# Patient Record
Sex: FEMALE | Race: ASIAN | Employment: STUDENT | URBAN - METROPOLITAN AREA
[De-identification: names, ages, dates, MRNs, and addresses within clinical notes are randomized per-mention and may not be internally consistent; named-entity substitution may affect disease eponyms.]

---

## 2022-02-21 ENCOUNTER — OFFICE VISIT (OUTPATIENT)
Dept: PSYCHIATRY | Facility: CLINIC | Age: 27
End: 2022-02-21
Payer: COMMERCIAL

## 2022-02-21 VITALS
HEIGHT: 63 IN | HEART RATE: 107 BPM | BODY MASS INDEX: 30.1 KG/M2 | WEIGHT: 169.88 LBS | SYSTOLIC BLOOD PRESSURE: 126 MMHG | DIASTOLIC BLOOD PRESSURE: 80 MMHG

## 2022-02-21 DIAGNOSIS — F41.1 GENERALIZED ANXIETY DISORDER: ICD-10-CM

## 2022-02-21 DIAGNOSIS — F33.1 MODERATE EPISODE OF RECURRENT MAJOR DEPRESSIVE DISORDER: Primary | ICD-10-CM

## 2022-02-21 DIAGNOSIS — F90.2 ADHD (ATTENTION DEFICIT HYPERACTIVITY DISORDER), COMBINED TYPE: ICD-10-CM

## 2022-02-21 PROCEDURE — 99999 PR PBB SHADOW E&M-NEW PATIENT-LVL II: CPT | Mod: PBBFAC,,, | Performed by: STUDENT IN AN ORGANIZED HEALTH CARE EDUCATION/TRAINING PROGRAM

## 2022-02-21 PROCEDURE — 99205 OFFICE O/P NEW HI 60 MIN: CPT | Mod: S$GLB,,, | Performed by: STUDENT IN AN ORGANIZED HEALTH CARE EDUCATION/TRAINING PROGRAM

## 2022-02-21 PROCEDURE — 99999 PR PBB SHADOW E&M-NEW PATIENT-LVL II: ICD-10-PCS | Mod: PBBFAC,,, | Performed by: STUDENT IN AN ORGANIZED HEALTH CARE EDUCATION/TRAINING PROGRAM

## 2022-02-21 PROCEDURE — 99205 PR OFFICE/OUTPT VISIT, NEW, LEVL V, 60-74 MIN: ICD-10-PCS | Mod: S$GLB,,, | Performed by: STUDENT IN AN ORGANIZED HEALTH CARE EDUCATION/TRAINING PROGRAM

## 2022-02-21 RX ORDER — FLUOXETINE HYDROCHLORIDE 20 MG/1
20 CAPSULE ORAL DAILY
Qty: 30 CAPSULE | Refills: 1 | Status: SHIPPED | OUTPATIENT
Start: 2022-02-21 | End: 2022-02-28

## 2022-02-21 NOTE — PROGRESS NOTES
OUTPATIENT PSYCHIATRY INITIAL VISIT    ENCOUNTER DATE:  2/21/2022  SITE:  [facility], [facility]  REFFERAL SOURCE:  Self, Aaareferral  LENGTH OF SESSION:  60 minutes        CHIEF COMPLAINT:   Depression, anxiety , adhd    HISTORY OF PRESENTING ILLNESS:  Flor Regalado is a 26 y.o. female with hx ADHD who presents for initial assessment.      History as told by Patient - & or family/friend/spouse/caregiver with pts permission  Pt diagnosed with ADHD in April 2021, was started on adderrall IR up to 20mg bid. Pt did not notice benefit but had side effects of appetite loss.  Pt also here to be evaluated for depression and anxiety. Started in high school 10th grade and also experienced in [employer] year [employer]. Pt with depressed mood, low energy, poor sleep, thoughts of guilt, gets increase in appetite, and has had passive SI, denies hx SA or active plan/intent.  Pt also with anxiety and worry, is tense throughout the day, has difficulty with sleep and gets easily frustrated.  Pt reports psychosocial stressor of family being unhappy and unsupportive of her. Reviewed ADHD symptoms as below.       DSM-V ADHD Criteria:    Inattentive symptoms 9/9:  · Often fails to give close attention to details or makes careless mistakes in schoolwork, at work, or during other activities (e.g., overlooks or misses details, work is inaccurate). yes   · Often has difficulty sustaining attention in tasks or play activities (e.g., has difficulty remaining focused during lectures, conversations, or lengthy reading). yes   · Often does not seem to listen when spoken to directly (e.g., mind seems elsewhere, even in the absence of any obvious distraction). yes -  · Often does not follow through on instructions and fails to finish schoolwork, chores, or duties in the workplace (e.g., starts tasks but quickly loses focus and is easily sidetracked). yes   · Often has difficulty organizing tasks and activities (e.g., difficulty managing  sequential tasks; difficulty keeping materials and belongings in order; messy, disorganized work; has poor time management; fails to meet deadlines). yes   · Often avoids, dislikes, or is reluctant to engage in tasks that require sustained mental effort (e.g., schoolwork or homework; for older adolescents and adults, preparing reports, completing forms, reviewing lengthy papers). yes   · Often loses things necessary for tasks or activities (e.g., school materials, pencils, books, tools, wallets, keys, papenwork, eyeglasses, mobile telephones). yes   · Is often easily distracted by extraneous stimuli (for older adolescents and adults, may include unrelated thoughts). yes   · Is often forgetful in daily activities (e.g., doing chores, running errands; for older adolescents and adults, returning calls, paying bills, keeping appointments). yes     Hyperactivity symptoms 6/9:  · Often fidgets with or taps hands or feet or squirms in seat. yes  · Often leaves seat in situations when remaining seated is expected (e.g., leaves his or her place in the classroom, in the office or other workplace, or in other situations that require remaining in place). No  · Often runs about or climbs in situations where it is inappropriate. (Note: In adolescents or adults, may be limited to feeling restless). yes  · Often unable to play or engage in leisure activities quietly. no  · Is often ?on the go,? acting as if ?driven by a motor? (e.g., is unable to be or uncomfortable being still for extended time, as in restaurants, meetings; may be experienced by others as being restless or difficult to keep up with). no  · Often talks excessively. yes -   · Often blurts out an answer before a question has been completed (e.g., completes people?s sentences; cannot wait for turn in conversation). yes -   · Often has difficulty waiting his or her turn (e.g., while waiting in line). yes - walk around  · Often interrupts or intrudes on others (e.g.,  butts into conversations, games, or activities; may start using other people?s things without asking or receiving permission; for adolescents and adults, may intrude into or take over what others are doing). yes - as a child      PSYCHIATRIC REVIEW OF SYMPTOMS: (Is patient experiencing or having changes in any of the following?)    Symptoms of Depression: diminished mood or loss of interest/anhedonia; irritability, diminished energy, change in sleep, change in appetite, diminished concentration or cognition or indecisiveness, PMA/R, excessive guilt or hopelessness or worthlessness, suicidal ideations - Passive/ Active, Self injurious behaviors- yes    Symptoms of STAR: excessive anxiety/worry/fear, more days than not, about numerous issues, difficult to control, with restlessness, fatigue, poor concentration, irritability, muscle tension, sleep disturbance; causes functionally impairing distress : yes    Symptoms of Panic Attacks: recurrent panic attacks-  SOB/ palpitations/ tremulousness, numbness/ paraesthesias/ nausea/ feeling of impending doom/ derealization/ depersonalization   Panic attacks are precipitated or un-precipitated, source of worry and/or behavioral changes secondary; with or without agoraphobia :  Yes, infrequent    Symptoms of lauren or hypomania: elevated, expansive, or irritable mood with increased energy or activity; with inflated self-esteem or grandiosity, decreased need for sleep, increased rate of speech,  racing thoughts, distractibility, increased goal directed activity or PMA, risky/disinhibited behavior:  No     Symptoms of psychosis: hallucinations, delusions, disorganized thinking, disorganized behavior or abnormal motor behavior, or negative symptoms (diminshed emotional expression, avolition, anhedonia, alogia, asociality :  no    Symptoms of PTSD: h/o trauma - physical abuse /sexual abuse / domestic violence/ other traumas); re-experiencing/intrusive symptoms, nightmares, avoidant  behavior, negative alterations in cognition or mood, or hyperarousal symptoms; with or without dissociative symptoms :  no    Sleep: initiation/ maintenance/ early morning awakening with inability to return to sleep/ hypnagogic or hypnaompic hallucinations:  Time to fall asleep    Other Psych ROS:    Symptoms of OCD: obsessions, compulsions or ruminations: no    Symptoms of Eating Disorders: anorexia, bulimia or binge eating:  Reports binge eating, but does not appear to satisfy disorder criteria    Symptoms of ADHD: inattention or hyperactivity:  As in hpi    Risk Parameters:  Patient reports no suicidal ideation  Patient reports no homicidal ideation  Patient reports no self-injurious behavior  Patient reports no violent behavior    PSYCHIATRIC MED REVIEW    Current psych meds  Medication side effects:  Loss of appetite  Medication compliance:   n/a    Previous psych meds trials  adderall IR up to 20mg bid    PAST PSYCHIATRIC HISTORY:  Previous Psychiatric Diagnoses:  adhd  Previous Psychiatric Hospitalizations:  no   Previous SI/HI:  passive  Previous Suicide Attempts:  no   Previous Self injurious behaviors: no  Previous Medication Trials:  adderall ir 20mg bid  Psychiatric Care (current & past):   Over past months for adhd only  History of Psychotherapy:  5-6 sessions only  History of Violence:  no    SUBSTANCE ABUSE HISTORY:  Caffeine: no  Tobacco:  no  Alcohol:  no  Illicit Substances:  no  Misuse of Prescription Medications:  No   Other: Herbal supplements/ online supplements: no        FAMILY HISTORY:  Psychiatric:  Mom with depression, adhd, and self harm.  Family H/o suicide:  no    SOCIAL HISTORY:  Developmental/Childhood:Achieved all developmental milestones timely  Education:Bachelor's Degree in math and music  Employment Status/Finances:   Relationship Status/Sexual Orientation: Single:    Children: 0  Housing Status: Home , lives alone   history:  NO  Access to Firearms: NO;  "  Sikhism:Non-practicing  Recreational activities:Crafts, video games, music    LEGAL HISTORY:   Past charges/incarcerations: No   Pending charges:No     NEUROLOGIC HISTORY:  Seizures:  no   Head trauma:  no    MEDICAL REVIEW OF SYSTEMS:  Complete review of systems performed covering Constitutional, Cardiovascular, Respiratory, Gastrointestinal, Musculoskeletal, Skin, Neurologic and Endocrine  All systems negative.    MEDICAL HISTORY:  No past medical history on file.    ALL MEDICATIONS:  No current outpatient medications on file.    ALLERGIES:  Review of patient's allergies indicates:  Not on File    RELEVANT LABS/STUDIES:    No results found for: WBC, HGB, HCT, MCV, PLT  BMP  No results found for: NA, K, CL, CO2, BUN, CREATININE, CALCIUM, ANIONGAP, ESTGFRAFRICA, EGFRNONAA  No results found for: ALT, AST, GGT, ALKPHOS, BILITOT  No results found for: TSH  No results found for: LABA1C, HGBA1C      VITALS  Vitals:    02/21/22 1345   BP: 126/80   Pulse: 107   Weight: 77 kg (169 lb 13.8 oz)   Height: 5' 3" (1.6 m)       PHYSICAL EXAM  General: well developed, well nourished  Neurologic:   Gait: Normal   Psychomotor signs:  No involuntary movements or tremor  AIMS: none    PSYCHIATRIC EXAM:    Mental Status Exam:  Appearance: unremarkable, age appropriate  Behavior/Cooperation: limited/ appropriate friendly and cooperative  Speech:  normal tone, normal rate, normal pitch, normal volume  Language: uses words appropriately; NO aphasia or dysarthria  Mood: anxious, depressed  Affect:  Congruent, tearful at times  Thought Process: normal and logical  Thought Content: normal, no suicidality, no homicidality, delusions, or paranoia  Level of Consciousness: Alert and Oriented x3  Memory:  Intact  Attention/concentration: appropriate for age/education.   Fund of Knowledge: appears adequate  Insight:  Intact  Judgment: Intact       IMPRESSION:    Flor Regalado is a 26 y.o. female with history of ADHD who presents for initial " assessment of depression, anxiety and trouble with focus/attention..    DIAGNOSES:    ICD-10-CM ICD-9-CM   1. Moderate episode of recurrent major depressive disorder  F33.1 296.32   2. Generalized anxiety disorder  F41.1 300.02   3. ADHD (attention deficit hyperactivity disorder), combined type  F90.2 314.01       PLAN:  Psych Med:   Start prozac 20mg. Pt reports intolerable nausea, sent in rx for prozac 10mg.  If not tolerated, consider lexapro 5mg initially.   Will address ADHD symptoms once significant anxiety/depression symptoms improve.    Consider referral to psychotherapy.      Discussed with patient informed consent, risks vs. benefits, alternative treatments, side effect profile and the inherent unpredictability of individual responses to these treatments. Answered any questions patient may have had. The patient expresses understanding of the above and displays the capacity to agree with this current plan     Other:     RETURN TO CLINIC: 1 month    M[place] MD  [facility], PGY-3  02/21/2022

## 2022-02-26 ENCOUNTER — PATIENT MESSAGE (OUTPATIENT)
Dept: PSYCHIATRY | Facility: CLINIC | Age: 27
End: 2022-02-26
Payer: COMMERCIAL

## 2022-02-28 ENCOUNTER — PATIENT MESSAGE (OUTPATIENT)
Dept: PSYCHIATRY | Facility: CLINIC | Age: 27
End: 2022-02-28
Payer: COMMERCIAL

## 2022-02-28 RX ORDER — FLUOXETINE 10 MG/1
10 CAPSULE ORAL DAILY
Qty: 30 CAPSULE | Refills: 0 | Status: SHIPPED | OUTPATIENT
Start: 2022-02-28 | End: 2022-03-21 | Stop reason: SDUPTHER

## 2022-03-19 ENCOUNTER — OFFICE VISIT (OUTPATIENT)
Dept: URGENT CARE | Facility: CLINIC | Age: 27
End: 2022-03-19
Payer: COMMERCIAL

## 2022-03-19 DIAGNOSIS — J45.909 ASTHMA, UNSPECIFIED ASTHMA SEVERITY, UNSPECIFIED WHETHER COMPLICATED, UNSPECIFIED WHETHER PERSISTENT: Primary | ICD-10-CM

## 2022-03-19 PROCEDURE — 1159F MED LIST DOCD IN RCRD: CPT | Mod: CPTII,S$GLB,, | Performed by: NURSE PRACTITIONER

## 2022-03-19 PROCEDURE — 99214 OFFICE O/P EST MOD 30 MIN: CPT | Mod: 25,S$GLB,, | Performed by: NURSE PRACTITIONER

## 2022-03-19 PROCEDURE — 3075F PR MOST RECENT SYSTOLIC BLOOD PRESS GE 130-139MM HG: ICD-10-PCS | Mod: CPTII,S$GLB,, | Performed by: NURSE PRACTITIONER

## 2022-03-19 PROCEDURE — 1159F PR MEDICATION LIST DOCUMENTED IN MEDICAL RECORD: ICD-10-PCS | Mod: CPTII,S$GLB,, | Performed by: NURSE PRACTITIONER

## 2022-03-19 PROCEDURE — 3008F PR BODY MASS INDEX (BMI) DOCUMENTED: ICD-10-PCS | Mod: CPTII,S$GLB,, | Performed by: NURSE PRACTITIONER

## 2022-03-19 PROCEDURE — 94640 AIRWAY INHALATION TREATMENT: CPT | Mod: S$GLB,,, | Performed by: NURSE PRACTITIONER

## 2022-03-19 PROCEDURE — 94640 PR INHAL RX, AIRWAY OBST/DX SPUTUM INDUCT: ICD-10-PCS | Mod: S$GLB,,, | Performed by: NURSE PRACTITIONER

## 2022-03-19 PROCEDURE — 3078F PR MOST RECENT DIASTOLIC BLOOD PRESSURE < 80 MM HG: ICD-10-PCS | Mod: CPTII,S$GLB,, | Performed by: NURSE PRACTITIONER

## 2022-03-19 PROCEDURE — 3075F SYST BP GE 130 - 139MM HG: CPT | Mod: CPTII,S$GLB,, | Performed by: NURSE PRACTITIONER

## 2022-03-19 PROCEDURE — 3078F DIAST BP <80 MM HG: CPT | Mod: CPTII,S$GLB,, | Performed by: NURSE PRACTITIONER

## 2022-03-19 PROCEDURE — 99214 PR OFFICE/OUTPT VISIT, EST, LEVL IV, 30-39 MIN: ICD-10-PCS | Mod: 25,S$GLB,, | Performed by: NURSE PRACTITIONER

## 2022-03-19 PROCEDURE — 3008F BODY MASS INDEX DOCD: CPT | Mod: CPTII,S$GLB,, | Performed by: NURSE PRACTITIONER

## 2022-03-19 RX ORDER — PREDNISONE 10 MG/1
10 TABLET ORAL DAILY
Qty: 5 TABLET | Refills: 0 | Status: SHIPPED | OUTPATIENT
Start: 2022-03-19 | End: 2022-03-24

## 2022-03-19 RX ORDER — NORGESTIMATE AND ETHINYL ESTRADIOL 0.25-0.035
KIT ORAL
COMMUNITY
Start: 2022-03-06

## 2022-03-19 RX ORDER — AMOXICILLIN AND CLAVULANATE POTASSIUM 875; 125 MG/1; MG/1
1 TABLET, FILM COATED ORAL 2 TIMES DAILY
Qty: 14 TABLET | Refills: 0 | Status: SHIPPED | OUTPATIENT
Start: 2022-03-19 | End: 2022-03-26

## 2022-03-19 RX ORDER — ALBUTEROL SULFATE 90 UG/1
2 AEROSOL, METERED RESPIRATORY (INHALATION) EVERY 6 HOURS PRN
Qty: 18 G | Refills: 3 | Status: SHIPPED | OUTPATIENT
Start: 2022-03-19 | End: 2022-04-18

## 2022-03-19 RX ORDER — ALBUTEROL SULFATE 0.83 MG/ML
2.5 SOLUTION RESPIRATORY (INHALATION)
Status: COMPLETED | OUTPATIENT
Start: 2022-03-19 | End: 2022-03-19

## 2022-03-19 RX ADMIN — ALBUTEROL SULFATE 2.5 MG: 0.83 SOLUTION RESPIRATORY (INHALATION) at 05:03

## 2022-03-19 NOTE — PROGRESS NOTES
"Subjective:       Patient ID: Flor Regalado is a 26 y.o. female.    Vitals:  height is 5' 3" (1.6 m) and weight is 76.7 kg (169 lb). Her temperature is 98.1 °F (36.7 °C). Her blood pressure is 134/79 and her pulse is 90. Her respiration is 18 and oxygen saturation is 96%.     Chief Complaint: Cough    Patient presents with c.o cough for the last 8 days. Cough is productive and is partned with wheezing. Patient notes the cough worse with activity or at night. Gives hx of asthma as a child. No fever. Patient with neg pcr covid test a few days ago.             Cough  This is a new problem. Episode onset: 8 days. The problem has been unchanged. The problem occurs every few minutes. The cough is productive of sputum. Associated symptoms include wheezing. Pertinent negatives include no chest pain, chills, ear pain, postnasal drip, sore throat or shortness of breath. The symptoms are aggravated by lying down. Treatments tried: mucinex dm. The treatment provided mild relief. Her past medical history is significant for asthma.   cough worse after act  Constitution: Negative for activity change, appetite change, chills and fatigue.   HENT: Negative for ear pain, ear discharge, hearing loss, facial swelling, congestion, postnasal drip, sinus pain, sinus pressure, sore throat, trouble swallowing and voice change.    Cardiovascular: Negative for chest trauma, chest pain, leg swelling and sob on exertion.   Respiratory: Positive for cough and wheezing. Negative for chest tightness and shortness of breath.    Neurological: Negative for dizziness, light-headedness, passing out, disorientation and altered mental status.   Psychiatric/Behavioral: Negative for altered mental status, disorientation, confusion, agitation and nervous/anxious. The patient is not nervous/anxious.        Objective:      Physical Exam   Constitutional: She is oriented to person, place, and time. She appears well-developed. She is cooperative.  Non-toxic " appearance. She does not appear ill. No distress.   HENT:   Head: Normocephalic and atraumatic.   Ears:   Right Ear: Hearing, tympanic membrane, external ear and ear canal normal.   Left Ear: Hearing, tympanic membrane, external ear and ear canal normal.   Nose: Nose normal. No mucosal edema, rhinorrhea or nasal deformity. No epistaxis. Right sinus exhibits no maxillary sinus tenderness and no frontal sinus tenderness. Left sinus exhibits no maxillary sinus tenderness and no frontal sinus tenderness.   Mouth/Throat: Uvula is midline, oropharynx is clear and moist and mucous membranes are normal. No trismus in the jaw. Normal dentition. No uvula swelling. No oropharyngeal exudate, posterior oropharyngeal edema or posterior oropharyngeal erythema.   Eyes: Conjunctivae and lids are normal. No scleral icterus.   Neck: Trachea normal and phonation normal. Neck supple. No edema present. No erythema present. No neck rigidity present.   Cardiovascular: Normal rate, regular rhythm, normal heart sounds and normal pulses.   Pulmonary/Chest: Effort normal. No respiratory distress. She has no decreased breath sounds. She has wheezes. She has no rhonchi.   Abdominal: Normal appearance.   Musculoskeletal: Normal range of motion.         General: No deformity. Normal range of motion.   Neurological: She is alert and oriented to person, place, and time. She exhibits normal muscle tone. Coordination normal.   Skin: Skin is warm, dry, intact, not diaphoretic and not pale.   Psychiatric: Her speech is normal and behavior is normal. Judgment and thought content normal.   Nursing note and vitals reviewed.  wheezing much improved after having albuerol sn tx      Assessment:           asthma  Plan:         Flor was seen today for cough.    Diagnoses and all orders for this visit:    Asthma, unspecified asthma severity, unspecified whether complicated, unspecified whether persistent    Other orders  -     albuterol nebulizer solution 2.5  mg  -     amoxicillin-clavulanate 875-125mg (AUGMENTIN) 875-125 mg per tablet; Take 1 tablet by mouth 2 (two) times daily. for 7 days  -     predniSONE (DELTASONE) 10 MG tablet; Take 1 tablet (10 mg total) by mouth once daily. for 5 days  -     albuterol (PROVENTIL/VENTOLIN HFA) 90 mcg/actuation inhaler; Inhale 2 puffs into the lungs every 6 (six) hours as needed for Wheezing.        Education  Pt has been given instructions populated from [facility] [employer] database and those entered into patient instructions field and has verbalized understanding of the after visit summary and information contained wherein.    Follow Up  If no better 3-5 day fu with pcp.    In Case of Emergency   May go to ER for acute shortness of breath, lightheadedness, fever, or any other emergent complaints or changes in condition.

## 2022-03-20 VITALS
WEIGHT: 169 LBS | HEIGHT: 63 IN | RESPIRATION RATE: 18 BRPM | SYSTOLIC BLOOD PRESSURE: 134 MMHG | TEMPERATURE: 98 F | DIASTOLIC BLOOD PRESSURE: 79 MMHG | BODY MASS INDEX: 29.95 KG/M2 | OXYGEN SATURATION: 96 % | HEART RATE: 90 BPM

## 2022-03-21 ENCOUNTER — OFFICE VISIT (OUTPATIENT)
Dept: PSYCHIATRY | Facility: CLINIC | Age: 27
End: 2022-03-21
Payer: COMMERCIAL

## 2022-03-21 VITALS
WEIGHT: 175.94 LBS | SYSTOLIC BLOOD PRESSURE: 133 MMHG | DIASTOLIC BLOOD PRESSURE: 67 MMHG | BODY MASS INDEX: 31.16 KG/M2 | HEART RATE: 83 BPM

## 2022-03-21 DIAGNOSIS — F41.1 GENERALIZED ANXIETY DISORDER: ICD-10-CM

## 2022-03-21 DIAGNOSIS — F90.2 ADHD (ATTENTION DEFICIT HYPERACTIVITY DISORDER), COMBINED TYPE: ICD-10-CM

## 2022-03-21 DIAGNOSIS — F33.1 MODERATE EPISODE OF RECURRENT MAJOR DEPRESSIVE DISORDER: Primary | ICD-10-CM

## 2022-03-21 PROCEDURE — 99999 PR PBB SHADOW E&M-EST. PATIENT-LVL II: CPT | Mod: PBBFAC,,, | Performed by: STUDENT IN AN ORGANIZED HEALTH CARE EDUCATION/TRAINING PROGRAM

## 2022-03-21 PROCEDURE — 99213 PR OFFICE/OUTPT VISIT, EST, LEVL III, 20-29 MIN: ICD-10-PCS | Mod: S$GLB,,, | Performed by: STUDENT IN AN ORGANIZED HEALTH CARE EDUCATION/TRAINING PROGRAM

## 2022-03-21 PROCEDURE — 99213 OFFICE O/P EST LOW 20 MIN: CPT | Mod: S$GLB,,, | Performed by: STUDENT IN AN ORGANIZED HEALTH CARE EDUCATION/TRAINING PROGRAM

## 2022-03-21 PROCEDURE — 99999 PR PBB SHADOW E&M-EST. PATIENT-LVL II: ICD-10-PCS | Mod: PBBFAC,,, | Performed by: STUDENT IN AN ORGANIZED HEALTH CARE EDUCATION/TRAINING PROGRAM

## 2022-03-21 RX ORDER — FLUOXETINE 10 MG/1
10 CAPSULE ORAL DAILY
Qty: 30 CAPSULE | Refills: 0 | Status: SHIPPED | OUTPATIENT
Start: 2022-03-21 | End: 2022-03-21 | Stop reason: SDUPTHER

## 2022-03-21 RX ORDER — FLUOXETINE 10 MG/1
10 CAPSULE ORAL DAILY
Qty: 30 CAPSULE | Refills: 0 | Status: SHIPPED | OUTPATIENT
Start: 2022-03-21 | End: 2022-05-30 | Stop reason: ALTCHOICE

## 2022-03-21 NOTE — PROGRESS NOTES
OUTPATIENT PSYCHIATRY FOLLOW UP VISIT    ENCOUNTER DATE:  3/21/2022  SITE:  [facility]      HISTORY OF PRESENTING ILLNESS:  Flor Regalado is a 26 y.o. female with hx ADHD, MDD, STAR who presents for follow up.       Pt reports improvement on prozac 10mg, could not tolerate 20mg due to nausea, but not having any side effects at current dose.  Mood is better, more relaxed, not as easily frustrated or overwhelmed. No panic attacks or 'crises'. Sleep has remained the same. Still having distractible symptoms that pt finds is driven by ADHD rather than anxiety/depression. Still snacking more frequently than pt desires. No SI.  Pt OK with remaining at same dose and addressing ADHD symptoms on next visit.         DSM-V ADHD Criteria:    Inattentive symptoms 9/9:  · Often fails to give close attention to details or makes careless mistakes in schoolwork, at work, or during other activities (e.g., overlooks or misses details, work is inaccurate). yes   · Often has difficulty sustaining attention in tasks or play activities (e.g., has difficulty remaining focused during lectures, conversations, or lengthy reading). yes   · Often does not seem to listen when spoken to directly (e.g., mind seems elsewhere, even in the absence of any obvious distraction). yes -  · Often does not follow through on instructions and fails to finish schoolwork, chores, or duties in the workplace (e.g., starts tasks but quickly loses focus and is easily sidetracked). yes   · Often has difficulty organizing tasks and activities (e.g., difficulty managing sequential tasks; difficulty keeping materials and belongings in order; messy, disorganized work; has poor time management; fails to meet deadlines). yes   · Often avoids, dislikes, or is reluctant to engage in tasks that require sustained mental effort (e.g., schoolwork or homework; for older adolescents and adults, preparing reports, completing forms, reviewing lengthy papers).  yes   · Often loses things necessary for tasks or activities (e.g., school materials, pencils, books, tools, wallets, keys, papenwork, eyeglasses, mobile telephones). yes   · Is often easily distracted by extraneous stimuli (for older adolescents and adults, may include unrelated thoughts). yes   · Is often forgetful in daily activities (e.g., doing chores, running errands; for older adolescents and adults, returning calls, paying bills, keeping appointments). yes     Hyperactivity symptoms 6/9:  · Often fidgets with or taps hands or feet or squirms in seat. yes  · Often leaves seat in situations when remaining seated is expected (e.g., leaves his or her place in the classroom, in the office or other workplace, or in other situations that require remaining in place). No  · Often runs about or climbs in situations where it is inappropriate. (Note: In adolescents or adults, may be limited to feeling restless). yes  · Often unable to play or engage in leisure activities quietly. no  · Is often ?on the go,? acting as if ?driven by a motor? (e.g., is unable to be or uncomfortable being still for extended time, as in restaurants, meetings; may be experienced by others as being restless or difficult to keep up with). no  · Often talks excessively. yes -   · Often blurts out an answer before a question has been completed (e.g., completes people?s sentences; cannot wait for turn in conversation). yes -   · Often has difficulty waiting his or her turn (e.g., while waiting in line). yes - walk around  · Often interrupts or intrudes on others (e.g., butts into conversations, games, or activities; may start using other people?s things without asking or receiving permission; for adolescents and adults, may intrude into or take over what others are doing). yes - as a child      PSYCHIATRIC REVIEW OF SYSTEMS:(none/ yes- better/worse/stable/& what symptoms)    Symptoms of Depression:  improved    Symptoms of Anxiety/ panic attacks:  improved, no panic attacks    Symptoms of Katey/Hypomania: none    Symptoms of psychosis: none    Sleep: time to fall asleep    Appetite: snacks more frequently when bored.     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         Risk Parameters:  Patient reports no suicidal ideation  Patient reports no homicidal ideation  Patient reports no self-injurious behavior  Patient reports no violent behavior    PSYCHIATRIC MED REVIEW    Current psych meds  Medication side effects:  Loss of appetite  Medication compliance:   n/a    Previous psych meds trials  adderall IR up to 20mg bid    PAST PSYCHIATRIC HISTORY:  Previous Psychiatric Diagnoses:  adhd  Previous Psychiatric Hospitalizations:  no   Previous SI/HI:  passive  Previous Suicide Attempts:  no   Previous Self injurious behaviors: no  Previous Medication Trials:  adderall ir 20mg bid  Psychiatric Care (current & past):   Over past months for adhd only  History of Psychotherapy:  5-6 sessions only  History of Violence:  no    SUBSTANCE ABUSE HISTORY:  Caffeine: no  Tobacco:  no  Alcohol:  no  Illicit Substances:  no  Misuse of Prescription Medications:  No   Other: Herbal supplements/ online supplements: no      FAMILY HISTORY:  Psychiatric:  Mom with depression, adhd, and self harm.  Family H/o suicide:  no    SOCIAL HISTORY:  Developmental/Childhood:Achieved all developmental milestones timely  Education:Bachelor's Degree in math and music  Employment Status/Finances:   Relationship Status/Sexual Orientation: Single:    Children: 0  Housing Status: Home , lives alone   history:  NO  Access to Firearms: NO;   Denominational:Non-practicing  Recreational activities:Crafts, video games, music    LEGAL HISTORY:   Past charges/incarcerations: No   Pending charges:No     NEUROLOGIC HISTORY:  Seizures:  no   Head trauma:  no    MEDICAL REVIEW OF  SYSTEMS:  Complete review of systems performed covering Constitutional, Cardiovascular, Respiratory, Gastrointestinal, Musculoskeletal, Skin, Neurologic and Endocrine  All systems negative.    MEDICAL HISTORY:  Past Medical History:   Diagnosis Date    Anxiety     Depression        ALL MEDICATIONS:    Current Outpatient Medications:     albuterol (PROVENTIL/VENTOLIN HFA) 90 mcg/actuation inhaler, Inhale 2 puffs into the lungs every 6 (six) hours as needed for Wheezing., Disp: 18 g, Rfl: 3    amoxicillin-clavulanate 875-125mg (AUGMENTIN) 875-125 mg per tablet, Take 1 tablet by mouth 2 (two) times daily. for 7 days, Disp: 14 tablet, Rfl: 0    ESTARYLLA 0.25-35 mg-mcg per tablet, Take by mouth., Disp: , Rfl:     FLUoxetine 10 MG capsule, Take 1 capsule (10 mg total) by mouth once daily., Disp: 30 capsule, Rfl: 0    predniSONE (DELTASONE) 10 MG tablet, Take 1 tablet (10 mg total) by mouth once daily. for 5 days, Disp: 5 tablet, Rfl: 0    ALLERGIES:  Review of patient's allergies indicates:   Allergen Reactions    Shellfish containing products Other (See Comments)     Throat itching         RELEVANT LABS/STUDIES:    No results found for: WBC, HGB, HCT, MCV, PLT  BMP  No results found for: NA, K, CL, CO2, BUN, CREATININE, CALCIUM, ANIONGAP, ESTGFRAFRICA, EGFRNONAA  No results found for: ALT, AST, GGT, ALKPHOS, BILITOT  No results found for: TSH  No results found for: LABA1C, HGBA1C      VITALS  Vitals:    03/21/22 0915   BP: 133/67   Pulse: 83   Weight: 79.8 kg (175 lb 14.8 oz)       PHYSICAL EXAM  General: well developed, well nourished  Neurologic:   Gait: Normal   Psychomotor signs:  No involuntary movements or tremor  AIMS: none    PSYCHIATRIC EXAM:    Mental Status Exam:  Appearance: unremarkable, age appropriate  Behavior/Cooperation: limited/ appropriate friendly and cooperative  Speech:  normal tone, normal rate, normal pitch, normal volume  Language: uses words appropriately; NO aphasia or  dysarthria  Mood: anxious, depressed  Affect:  Congruent, tearful at times  Thought Process: normal and logical  Thought Content: normal, no suicidality, no homicidality, delusions, or paranoia  Level of Consciousness: Alert and Oriented x3  Memory:  Intact  Attention/concentration: appropriate for age/education.   Fund of Knowledge: appears adequate  Insight:  Intact  Judgment: Intact       IMPRESSION:    Flor Regalado is a 26 y.o. female with history of ADHD who presents for initial assessment of depression, anxiety and trouble with focus/attention..    DIAGNOSES:    ICD-10-CM ICD-9-CM   1. Moderate episode of recurrent major depressive disorder  F33.1 296.32   2. Generalized anxiety disorder  F41.1 300.02   3. ADHD (attention deficit hyperactivity disorder), combined type  F90.2 314.01       PLAN:  Psych Med:   Continue prozac 10mg.    Will address ADHD symptoms once significant anxiety/depression symptoms improve.    Consider referral to psychotherapy if needed.      Discussed with patient informed consent, risks vs. benefits, alternative treatments, side effect profile and the inherent unpredictability of individual responses to these treatments. Answered any questions patient may have had. The patient expresses understanding of the above and displays the capacity to agree with this current plan       RETURN TO CLINIC: 1 month    Mt Warren MD  [facility], PGY-3  03/21/2022

## 2022-03-28 ENCOUNTER — OFFICE VISIT (OUTPATIENT)
Dept: PRIMARY CARE CLINIC | Facility: CLINIC | Age: 27
End: 2022-03-28
Payer: COMMERCIAL

## 2022-03-28 VITALS
HEIGHT: 63 IN | RESPIRATION RATE: 18 BRPM | HEART RATE: 84 BPM | TEMPERATURE: 98 F | WEIGHT: 177 LBS | OXYGEN SATURATION: 98 % | BODY MASS INDEX: 31.36 KG/M2 | DIASTOLIC BLOOD PRESSURE: 70 MMHG | SYSTOLIC BLOOD PRESSURE: 122 MMHG

## 2022-03-28 DIAGNOSIS — Z00.00 ANNUAL PHYSICAL EXAM: ICD-10-CM

## 2022-03-28 DIAGNOSIS — M25.519 SHOULDER PAIN, UNSPECIFIED CHRONICITY, UNSPECIFIED LATERALITY: ICD-10-CM

## 2022-03-28 DIAGNOSIS — F33.1 MODERATE EPISODE OF RECURRENT MAJOR DEPRESSIVE DISORDER: ICD-10-CM

## 2022-03-28 DIAGNOSIS — R05.9 COUGH: Primary | ICD-10-CM

## 2022-03-28 PROCEDURE — 99999 PR PBB SHADOW E&M-EST. PATIENT-LVL V: CPT | Mod: PBBFAC,,, | Performed by: FAMILY MEDICINE

## 2022-03-28 PROCEDURE — 1160F RVW MEDS BY RX/DR IN RCRD: CPT | Mod: CPTII,S$GLB,, | Performed by: FAMILY MEDICINE

## 2022-03-28 PROCEDURE — 3078F DIAST BP <80 MM HG: CPT | Mod: CPTII,S$GLB,, | Performed by: FAMILY MEDICINE

## 2022-03-28 PROCEDURE — 3078F PR MOST RECENT DIASTOLIC BLOOD PRESSURE < 80 MM HG: ICD-10-PCS | Mod: CPTII,S$GLB,, | Performed by: FAMILY MEDICINE

## 2022-03-28 PROCEDURE — 3008F PR BODY MASS INDEX (BMI) DOCUMENTED: ICD-10-PCS | Mod: CPTII,S$GLB,, | Performed by: FAMILY MEDICINE

## 2022-03-28 PROCEDURE — 1160F PR REVIEW ALL MEDS BY PRESCRIBER/CLIN PHARMACIST DOCUMENTED: ICD-10-PCS | Mod: CPTII,S$GLB,, | Performed by: FAMILY MEDICINE

## 2022-03-28 PROCEDURE — 3074F PR MOST RECENT SYSTOLIC BLOOD PRESSURE < 130 MM HG: ICD-10-PCS | Mod: CPTII,S$GLB,, | Performed by: FAMILY MEDICINE

## 2022-03-28 PROCEDURE — 3074F SYST BP LT 130 MM HG: CPT | Mod: CPTII,S$GLB,, | Performed by: FAMILY MEDICINE

## 2022-03-28 PROCEDURE — 1159F PR MEDICATION LIST DOCUMENTED IN MEDICAL RECORD: ICD-10-PCS | Mod: CPTII,S$GLB,, | Performed by: FAMILY MEDICINE

## 2022-03-28 PROCEDURE — 99999 PR PBB SHADOW E&M-EST. PATIENT-LVL V: ICD-10-PCS | Mod: PBBFAC,,, | Performed by: FAMILY MEDICINE

## 2022-03-28 PROCEDURE — 99203 OFFICE O/P NEW LOW 30 MIN: CPT | Mod: S$GLB,,, | Performed by: FAMILY MEDICINE

## 2022-03-28 PROCEDURE — 1159F MED LIST DOCD IN RCRD: CPT | Mod: CPTII,S$GLB,, | Performed by: FAMILY MEDICINE

## 2022-03-28 PROCEDURE — 3008F BODY MASS INDEX DOCD: CPT | Mod: CPTII,S$GLB,, | Performed by: FAMILY MEDICINE

## 2022-03-28 PROCEDURE — 99203 PR OFFICE/OUTPT VISIT, NEW, LEVL III, 30-44 MIN: ICD-10-PCS | Mod: S$GLB,,, | Performed by: FAMILY MEDICINE

## 2022-03-28 RX ORDER — IBUPROFEN 600 MG/1
600 TABLET ORAL 3 TIMES DAILY
Qty: 21 TABLET | Refills: 0 | Status: SHIPPED | OUTPATIENT
Start: 2022-03-28

## 2022-03-28 RX ORDER — INFLUENZA VIRUS VACCINE 15; 15; 15; 15 UG/.5ML; UG/.5ML; UG/.5ML; UG/.5ML
SUSPENSION INTRAMUSCULAR
COMMUNITY
Start: 2021-10-01

## 2022-03-28 RX ORDER — MONTELUKAST SODIUM 10 MG/1
10 TABLET ORAL NIGHTLY
Qty: 90 TABLET | Refills: 0 | Status: SHIPPED | OUTPATIENT
Start: 2022-03-28 | End: 2022-04-27

## 2022-03-28 RX ORDER — FLUTICASONE PROPIONATE 110 UG/1
1 AEROSOL, METERED RESPIRATORY (INHALATION) 2 TIMES DAILY
Qty: 12 G | Refills: 0 | Status: SHIPPED | OUTPATIENT
Start: 2022-03-28 | End: 2023-03-28

## 2022-03-28 NOTE — PATIENT INSTRUCTIONS
Start singulair daily and use flovent inhaler twice a day and wash mouth out after using.  Use albuterol as needed.  Get Chest Xray  Get fasting labs  Use ibuprofen 600mg three times a day for 1 week with food.

## 2022-03-28 NOTE — PROGRESS NOTES
[facility] Note    Chief Complaint      Chief Complaint   Patient presents with    Establish Care       History of Present Illness      Flor Regalado is a 26 y.o. female who presents today for:    Asthma -  Used nebulizer and allergy shots as child.  No inhalers.  Never hospitalized  Seen in urgent care for 2 weeks of productive cough. Was treated with augmentin and steroid and given albuterol for wheeze.  Minimally better.   Felt like nebulizer made her better and albuterol helps.     No fever, no sore throat. No abdominal pain and not worse after eating.       2.5 weeks ago woke up with left shoulder pain.  Intermittent. 2-3/ day.  Hurts when raises her arm.     Boyfriend started with symptoms yest.  Works at a school.  PCR tests Qweek and negative.       Patient Care Team:  Trinity Whitman MD as PCP - General (Internal Medicine)     Health Maintenance:  Immunization History   Administered Date(s) Administered    COVID-19, MRNA, LN-S, PF (Pfizer) (Purple Cap) 03/02/2021, 03/23/2021, 10/01/2021    Influenza - Quadrivalent - PF *Preferred* (6 months and older) 10/01/2021      Health Maintenance   Topic Date Due    Hepatitis C Screening  Never done    Lipid Panel  Never done    HPV Vaccines (1 - 2-dose series) Never done    Chlamydia Screening  Never done    TETANUS VACCINE  Never done    Pap Smear  Never done        Past Medical History:  Past Medical History:   Diagnosis Date    Anxiety     Depression        Past Surgical History:   has no past surgical history on file.    Family History:  family history includes Breast cancer in her maternal grandmother; Diabetes in her maternal grandfather; Thyroid cancer in her mother.     Social History:  Social History     Tobacco Use    Smoking status: Never Smoker    Smokeless tobacco: Never Used   Substance Use Topics    Alcohol use: Yes     Alcohol/week: 4.0 standard drinks     Types: 4 Standard drinks or equivalent per week    Drug use: Never  "      Review of Systems   Constitutional: Negative for fever.   Respiratory: Negative for shortness of breath.    Cardiovascular: Negative for chest pain.   Gastrointestinal: Negative for change in bowel habit and change in bowel habit.   Genitourinary: Negative for difficulty urinating.        Medications:    Current Outpatient Medications:     albuterol (PROVENTIL/VENTOLIN HFA) 90 mcg/actuation inhaler, Inhale 2 puffs into the lungs every 6 (six) hours as needed for Wheezing., Disp: 18 g, Rfl: 3    ESTARYLLA 0.25-35 mg-mcg per tablet, Take by mouth., Disp: , Rfl:     FLUoxetine 10 MG capsule, Take 1 capsule (10 mg total) by mouth once daily., Disp: 30 capsule, Rfl: 0    FLULAVAL QUAD 1395-1704, PF, 60 mcg (15 mcg x 4)/0.5 mL Syrg, , Disp: , Rfl:     fluticasone propionate (FLOVENT HFA) 110 mcg/actuation inhaler, Inhale 1 puff into the lungs 2 (two) times daily. Controller, Disp: 12 g, Rfl: 0    ibuprofen (ADVIL,MOTRIN) 600 MG tablet, Take 1 tablet (600 mg total) by mouth 3 (three) times daily., Disp: 21 tablet, Rfl: 0    montelukast (SINGULAIR) 10 mg tablet, Take 1 tablet (10 mg total) by mouth every evening., Disp: 90 tablet, Rfl: 0    sars-cov-2, covid-19, (PFIZER COVID-19) 30 mcg/0.3 ml injection, , Disp: , Rfl:      Allergies:  Review of patient's allergies indicates:   Allergen Reactions    Shellfish containing products Other (See Comments)     Throat itching         Physical Exam      Vital Signs  Temp: 98 °F (36.7 °C)  Pulse: 84  Resp: 18  SpO2: 98 %  BP: 122/70  BP Location: Left arm  Patient Position: Sitting  Pain Score:   2  Height and Weight  Height: 5' 3" (160 cm)  Weight: 80.3 kg (177 lb 0.5 oz)  BSA (Calculated - sq m): 1.89 sq meters  BMI (Calculated): 31.4  Weight in (lb) to have BMI = 25: 140.8      Patient Position: Sitting      Physical Exam  Vitals reviewed.   Constitutional:       General: She is not in acute distress.     Appearance: Normal appearance.   HENT:      Right Ear: " Tympanic membrane, ear canal and external ear normal.      Left Ear: Tympanic membrane, ear canal and external ear normal.      Mouth/Throat:      Mouth: Mucous membranes are moist.      Pharynx: Oropharynx is clear. No oropharyngeal exudate or posterior oropharyngeal erythema.   Eyes:      Extraocular Movements: Extraocular movements intact.      Conjunctiva/sclera: Conjunctivae normal.      Pupils: Pupils are equal, round, and reactive to light.   Cardiovascular:      Rate and Rhythm: Normal rate and regular rhythm.      Pulses: Normal pulses.      Heart sounds: No murmur heard.    No friction rub. No gallop.   Pulmonary:      Effort: Pulmonary effort is normal.      Breath sounds: No wheezing, rhonchi or rales.   Abdominal:      General: Abdomen is flat. Bowel sounds are normal. There is no distension.      Palpations: Abdomen is soft. There is no mass.      Tenderness: There is no abdominal tenderness.   Musculoskeletal:      Cervical back: Neck supple.      Right lower leg: No edema.      Left lower leg: No edema.   Lymphadenopathy:      Cervical: No cervical adenopathy.   Skin:     General: Skin is warm and dry.   Neurological:      General: No focal deficit present.      Mental Status: She is alert.   Psychiatric:         Mood and Affect: Mood normal.         Behavior: Behavior normal.          Laboratory:  CBC:        CMP:            URINALYSIS:         LIPIDS:        TSH:        A1C:        Urine Microalbumin/Cr:          Other:             Assessment/Plan     Flor Regalado is a 26 y.o.female with:    Cough  -     X-Ray Chest PA And Lateral; Future; Expected date: 03/28/2022  -     Lipase; Future; Expected date: 03/28/2022  Check Xray, flonase, singulair and prn albuterol    Moderate episode of recurrent major depressive disorder  - stable, continue current medication    Shoulder pain  Ibuprofen    Annual physical exam  -     CBC Auto Differential; Future; Expected date: 03/28/2022  -     Comprehensive  Metabolic Panel; Future; Expected date: 03/28/2022  -     Hepatitis C Antibody; Future; Expected date: 03/28/2022  -     HIV 1/2 Ag/Ab (4th Gen); Future; Expected date: 03/28/2022    Other orders  -     montelukast (SINGULAIR) 10 mg tablet; Take 1 tablet (10 mg total) by mouth every evening.  Dispense: 90 tablet; Refill: 0  -     fluticasone propionate (FLOVENT HFA) 110 mcg/actuation inhaler; Inhale 1 puff into the lungs 2 (two) times daily. Controller  Dispense: 12 g; Refill: 0  -     ibuprofen (ADVIL,MOTRIN) 600 MG tablet; Take 1 tablet (600 mg total) by mouth 3 (three) times daily.  Dispense: 21 tablet; Refill: 0         Chronic conditions status updated as per HPI.  Other than changes above, cont current medications and maintain follow up with specialists.  Return to clinic in Follow up in about 2 weeks (around 4/11/2022).      Trinity Whitman MD  [facility]

## 2022-03-28 NOTE — PROGRESS NOTES
STAFF COMMENTS: I have discussed pt with Dr. Warren and reviewed the history and exam. I agree and concur with the assessment and plan.

## 2022-04-01 ENCOUNTER — HOSPITAL ENCOUNTER (OUTPATIENT)
Dept: RADIOLOGY | Facility: OTHER | Age: 27
Discharge: HOME | End: 2022-04-01
Attending: FAMILY MEDICINE
Payer: COMMERCIAL

## 2022-04-01 DIAGNOSIS — R05.9 COUGH: ICD-10-CM

## 2022-04-01 PROCEDURE — 71046 X-RAY EXAM CHEST 2 VIEWS: CPT | Mod: 26,,, | Performed by: INTERNAL MEDICINE

## 2022-04-01 PROCEDURE — 71046 X-RAY EXAM CHEST 2 VIEWS: CPT | Mod: TC,FY

## 2022-04-01 PROCEDURE — 71046 XR CHEST PA AND LATERAL: ICD-10-PCS | Mod: 26,,, | Performed by: INTERNAL MEDICINE

## 2022-05-09 ENCOUNTER — OFFICE VISIT (OUTPATIENT)
Dept: PSYCHIATRY | Facility: CLINIC | Age: 27
End: 2022-05-09
Payer: COMMERCIAL

## 2022-05-09 VITALS
DIASTOLIC BLOOD PRESSURE: 74 MMHG | SYSTOLIC BLOOD PRESSURE: 118 MMHG | WEIGHT: 175.5 LBS | HEART RATE: 95 BPM | BODY MASS INDEX: 31.09 KG/M2

## 2022-05-09 DIAGNOSIS — F41.1 GENERALIZED ANXIETY DISORDER: ICD-10-CM

## 2022-05-09 DIAGNOSIS — F90.2 ADHD (ATTENTION DEFICIT HYPERACTIVITY DISORDER), COMBINED TYPE: ICD-10-CM

## 2022-05-09 DIAGNOSIS — F33.1 MODERATE EPISODE OF RECURRENT MAJOR DEPRESSIVE DISORDER: Primary | ICD-10-CM

## 2022-05-09 PROCEDURE — 99213 OFFICE O/P EST LOW 20 MIN: CPT | Mod: S$GLB,,, | Performed by: STUDENT IN AN ORGANIZED HEALTH CARE EDUCATION/TRAINING PROGRAM

## 2022-05-09 PROCEDURE — 99999 PR PBB SHADOW E&M-EST. PATIENT-LVL II: CPT | Mod: PBBFAC,,, | Performed by: STUDENT IN AN ORGANIZED HEALTH CARE EDUCATION/TRAINING PROGRAM

## 2022-05-09 PROCEDURE — 99213 PR OFFICE/OUTPT VISIT, EST, LEVL III, 20-29 MIN: ICD-10-PCS | Mod: S$GLB,,, | Performed by: STUDENT IN AN ORGANIZED HEALTH CARE EDUCATION/TRAINING PROGRAM

## 2022-05-09 PROCEDURE — 99999 PR PBB SHADOW E&M-EST. PATIENT-LVL II: ICD-10-PCS | Mod: PBBFAC,,, | Performed by: STUDENT IN AN ORGANIZED HEALTH CARE EDUCATION/TRAINING PROGRAM

## 2022-05-09 RX ORDER — ESCITALOPRAM OXALATE 5 MG/1
5 TABLET ORAL DAILY
Qty: 30 TABLET | Refills: 0 | Status: SHIPPED | OUTPATIENT
Start: 2022-05-09 | End: 2022-05-30 | Stop reason: SDUPTHER

## 2022-05-09 RX ORDER — METHYLPHENIDATE HYDROCHLORIDE 10 MG/1
10 CAPSULE, EXTENDED RELEASE ORAL EVERY MORNING
Qty: 30 CAPSULE | Refills: 0 | Status: SHIPPED | OUTPATIENT
Start: 2022-05-09 | End: 2022-05-30

## 2022-05-09 NOTE — PROGRESS NOTES
OUTPATIENT PSYCHIATRY FOLLOW UP VISIT    ENCOUNTER DATE:  5/9/2022  SITE:  [facility], [facility]      HISTORY OF PRESENTING ILLNESS:  Flor Regalado is a 26 y.o. female with hx ADHD, MDD, STAR who presents for follow up.       Pt reports running out of prozac 1 week ago due to missing last appointment, has noticed being more tired , also has been getting somewhat overwhelmed and anxious, feeling low or helpless when taking prozac 10mg. Also finds that a lot of anxiety is driven by adhd symptoms of not able to focus, losing things, trying to be 'normal' in front of others.  Pt states may be moving back to [place] next month. Is ok with trying different anti-depressant but would also like adhd treatment.       DSM-V ADHD Criteria:    Inattentive symptoms 9/9:  · Often fails to give close attention to details or makes careless mistakes in schoolwork, at work, or during other activities (e.g., overlooks or misses details, work is inaccurate). yes   · Often has difficulty sustaining attention in tasks or play activities (e.g., has difficulty remaining focused during lectures, conversations, or lengthy reading). yes   · Often does not seem to listen when spoken to directly (e.g., mind seems elsewhere, even in the absence of any obvious distraction). yes -  · Often does not follow through on instructions and fails to finish schoolwork, chores, or duties in the workplace (e.g., starts tasks but quickly loses focus and is easily sidetracked). yes   · Often has difficulty organizing tasks and activities (e.g., difficulty managing sequential tasks; difficulty keeping materials and belongings in order; messy, disorganized work; has poor time management; fails to meet deadlines). yes   · Often avoids, dislikes, or is reluctant to engage in tasks that require sustained mental effort (e.g., schoolwork or homework; for older adolescents and adults, preparing reports, completing forms, reviewing lengthy papers). yes    · Often loses things necessary for tasks or activities (e.g., school materials, pencils, books, tools, wallets, keys, papenwork, eyeglasses, mobile telephones). yes   · Is often easily distracted by extraneous stimuli (for older adolescents and adults, may include unrelated thoughts). yes   · Is often forgetful in daily activities (e.g., doing chores, running errands; for older adolescents and adults, returning calls, paying bills, keeping appointments). yes     Hyperactivity symptoms 6/9:  · Often fidgets with or taps hands or feet or squirms in seat. yes  · Often leaves seat in situations when remaining seated is expected (e.g., leaves his or her place in the classroom, in the office or other workplace, or in other situations that require remaining in place). No  · Often runs about or climbs in situations where it is inappropriate. (Note: In adolescents or adults, may be limited to feeling restless). yes  · Often unable to play or engage in leisure activities quietly. no  · Is often ?on the go,? acting as if ?driven by a motor? (e.g., is unable to be or uncomfortable being still for extended time, as in restaurants, meetings; may be experienced by others as being restless or difficult to keep up with). no  · Often talks excessively. yes -   · Often blurts out an answer before a question has been completed (e.g., completes people?s sentences; cannot wait for turn in conversation). yes -   · Often has difficulty waiting his or her turn (e.g., while waiting in line). yes - walk around  · Often interrupts or intrudes on others (e.g., butts into conversations, games, or activities; may start using other people?s things without asking or receiving permission; for adolescents and adults, may intrude into or take over what others are doing). yes - as a child      PSYCHIATRIC REVIEW OF SYSTEMS:(none/ yes- better/worse/stable/& what symptoms)    Symptoms of Depression:  improved    Symptoms of Anxiety/ panic attacks:  improved, no panic attacks    Symptoms of Katey/Hypomania: none    Symptoms of psychosis: none    Sleep: time to fall asleep    Appetite: snacks more frequently when bored.     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         Risk Parameters:  Patient reports no suicidal ideation  Patient reports no homicidal ideation  Patient reports no self-injurious behavior  Patient reports no violent behavior    PSYCHIATRIC MED REVIEW    Current psych meds  Medication side effects:  Loss of appetite  Medication compliance:   n/a    Previous psych meds trials  adderall IR up to 20mg bid    PAST PSYCHIATRIC HISTORY:  Previous Psychiatric Diagnoses:  adhd  Previous Psychiatric Hospitalizations:  no   Previous SI/HI:  passive  Previous Suicide Attempts:  no   Previous Self injurious behaviors: no  Previous Medication Trials:  adderall ir 20mg bid  Psychiatric Care (current & past):   Over past months for adhd only  History of Psychotherapy:  5-6 sessions only  History of Violence:  no    SUBSTANCE ABUSE HISTORY:  Caffeine: no  Tobacco:  no  Alcohol:  no  Illicit Substances:  no  Misuse of Prescription Medications:  No   Other: Herbal supplements/ online supplements: no      FAMILY HISTORY:  Psychiatric:  Mom with depression, adhd, and self harm.  Family H/o suicide:  no    SOCIAL HISTORY:  Developmental/Childhood:Achieved all developmental milestones timely  Education:Bachelor's Degree in math and music  Employment Status/Finances:   Relationship Status/Sexual Orientation: Single:    Children: 0  Housing Status: Home , lives alone   history:  NO  Access to Firearms: NO;   Sabianism:Non-practicing  Recreational activities:Crafts, video games, music    LEGAL HISTORY:   Past charges/incarcerations: No   Pending charges:No     NEUROLOGIC HISTORY:  Seizures:  no   Head trauma:  no    MEDICAL REVIEW OF  SYSTEMS:  Complete review of systems performed covering Constitutional, Cardiovascular, Respiratory, Gastrointestinal, Musculoskeletal, Skin, Neurologic and Endocrine  All systems negative.    MEDICAL HISTORY:  Past Medical History:   Diagnosis Date    Anxiety     Depression        ALL MEDICATIONS:    Current Outpatient Medications:     albuterol (PROVENTIL/VENTOLIN HFA) 90 mcg/actuation inhaler, Inhale 2 puffs into the lungs every 6 (six) hours as needed for Wheezing., Disp: 18 g, Rfl: 3    EScitalopram oxalate (LEXAPRO) 5 MG Tab, Take 1 tablet (5 mg total) by mouth once daily., Disp: 30 tablet, Rfl: 0    ESTARYLLA 0.25-35 mg-mcg per tablet, Take by mouth., Disp: , Rfl:     FLULAVAL QUAD 0800-7754, PF, 60 mcg (15 mcg x 4)/0.5 mL Syrg, , Disp: , Rfl:     FLUoxetine 10 MG capsule, Take 1 capsule (10 mg total) by mouth once daily., Disp: 30 capsule, Rfl: 0    fluticasone propionate (FLOVENT HFA) 110 mcg/actuation inhaler, Inhale 1 puff into the lungs 2 (two) times daily. Controller, Disp: 12 g, Rfl: 0    ibuprofen (ADVIL,MOTRIN) 600 MG tablet, Take 1 tablet (600 mg total) by mouth 3 (three) times daily., Disp: 21 tablet, Rfl: 0    methylphenidate HCl (RITALIN LA) 10 MG 24 hr capsule, Take 1 capsule (10 mg total) by mouth every morning., Disp: 30 capsule, Rfl: 0    sars-cov-2, covid-19, (PFIZER COVID-19) 30 mcg/0.3 ml injection, , Disp: , Rfl:     ALLERGIES:  Review of patient's allergies indicates:   Allergen Reactions    Shellfish containing products Other (See Comments)     Throat itching         RELEVANT LABS/STUDIES:    No results found for: WBC, HGB, HCT, MCV, PLT  BMP  No results found for: NA, K, CL, CO2, BUN, CREATININE, CALCIUM, ANIONGAP, ESTGFRAFRICA, EGFRNONAA  No results found for: ALT, AST, GGT, ALKPHOS, BILITOT  No results found for: TSH  No results found for: LABA1C, HGBA1C      VITALS  Vitals:    05/09/22 1545   BP: 118/74   Pulse: 95   Weight: 79.6 kg (175 lb 7.8 oz)       PHYSICAL  EXAM  General: well developed, well nourished  Neurologic:   Gait: Normal   Psychomotor signs:  No involuntary movements or tremor  AIMS: none    PSYCHIATRIC EXAM:    Mental Status Exam:  Appearance: unremarkable, age appropriate  Behavior/Cooperation: limited/ appropriate friendly and cooperative  Speech:  normal tone, normal rate, normal pitch, normal volume  Language: uses words appropriately; NO aphasia or dysarthria  Mood: anxious, depressed  Affect:    Thought Process: normal and logical  Thought Content: normal, no suicidality, no homicidality, delusions, or paranoia  Level of Consciousness: Alert and Oriented x3  Memory:  Intact  Attention/concentration: appropriate for age/education.   Fund of Knowledge: appears adequate  Insight:  Intact  Judgment: Intact       IMPRESSION:    Flor Regalado is a 26 y.o. female with history of ADHD who presents for initial assessment of depression, anxiety and trouble with focus/attention..    DIAGNOSES:    ICD-10-CM ICD-9-CM   1. Moderate episode of recurrent major depressive disorder  F33.1 296.32   2. Generalized anxiety disorder  F41.1 300.02   3. ADHD (attention deficit hyperactivity disorder), combined type  F90.2 314.01       PLAN:  Psych Med:   Switch prozac 10mg to lexapro 5mg due to unable tolerate higher doses. titrate    Trial ritalin LA 10mg daily.     Consider referral to psychotherapy if needed.      Discussed with patient informed consent, risks vs. benefits, alternative treatments, side effect profile and the inherent unpredictability of individual responses to these treatments. Answered any questions patient may have had. The patient expresses understanding of the above and displays the capacity to agree with this current plan       RETURN TO CLINIC: 3-4 weeks    Mt Warren MD  [facility], PGY-3  05/09/2022

## 2022-05-30 ENCOUNTER — OFFICE VISIT (OUTPATIENT)
Dept: PSYCHIATRY | Facility: CLINIC | Age: 27
End: 2022-05-30
Payer: COMMERCIAL

## 2022-05-30 DIAGNOSIS — F90.2 ADHD (ATTENTION DEFICIT HYPERACTIVITY DISORDER), COMBINED TYPE: Primary | ICD-10-CM

## 2022-05-30 DIAGNOSIS — F41.1 GENERALIZED ANXIETY DISORDER: ICD-10-CM

## 2022-05-30 DIAGNOSIS — F33.1 MODERATE EPISODE OF RECURRENT MAJOR DEPRESSIVE DISORDER: ICD-10-CM

## 2022-05-30 PROCEDURE — 99213 PR OFFICE/OUTPT VISIT, EST, LEVL III, 20-29 MIN: ICD-10-PCS | Mod: 95,,, | Performed by: STUDENT IN AN ORGANIZED HEALTH CARE EDUCATION/TRAINING PROGRAM

## 2022-05-30 PROCEDURE — 99213 OFFICE O/P EST LOW 20 MIN: CPT | Mod: 95,,, | Performed by: STUDENT IN AN ORGANIZED HEALTH CARE EDUCATION/TRAINING PROGRAM

## 2022-05-30 RX ORDER — ESCITALOPRAM OXALATE 10 MG/1
10 TABLET ORAL DAILY
Qty: 90 TABLET | Refills: 0 | Status: SHIPPED | OUTPATIENT
Start: 2022-05-30 | End: 2022-08-28

## 2022-05-30 RX ORDER — METHYLPHENIDATE HYDROCHLORIDE 20 MG/1
20 CAPSULE, EXTENDED RELEASE ORAL EVERY MORNING
Qty: 30 CAPSULE | Refills: 0 | Status: SHIPPED | OUTPATIENT
Start: 2022-05-30

## 2022-05-30 NOTE — PROGRESS NOTES
OUTPATIENT PSYCHIATRY FOLLOW UP VISIT    ENCOUNTER DATE:  5/30/2022  SITE:  [facility], [facility]      HISTORY OF PRESENTING ILLNESS:  Flor Regalado is a 26 y.o. female with hx ADHD, MDD, STAR who presents for follow up.       Pt taking lexapro and ritalin LA, denies adverse effects. Finds mood is better, less easily frustrated. Denies excessive anxiety or depression. Has not had much work or assignments, has not noticed much benefit on current dose of ritalin. Will be starting [employer] school in a few weeks.  Pt will also be moving to [place] in a few weeks, advised pt to find provider asap. Denies other complaints or concerns.       DSM-V ADHD Criteria:    Inattentive symptoms 9/9:  · Often fails to give close attention to details or makes careless mistakes in schoolwork, at work, or during other activities (e.g., overlooks or misses details, work is inaccurate). yes   · Often has difficulty sustaining attention in tasks or play activities (e.g., has difficulty remaining focused during lectures, conversations, or lengthy reading). yes   · Often does not seem to listen when spoken to directly (e.g., mind seems elsewhere, even in the absence of any obvious distraction). yes -  · Often does not follow through on instructions and fails to finish schoolwork, chores, or duties in the workplace (e.g., starts tasks but quickly loses focus and is easily sidetracked). yes   · Often has difficulty organizing tasks and activities (e.g., difficulty managing sequential tasks; difficulty keeping materials and belongings in order; messy, disorganized work; has poor time management; fails to meet deadlines). yes   · Often avoids, dislikes, or is reluctant to engage in tasks that require sustained mental effort (e.g., schoolwork or homework; for older adolescents and adults, preparing reports, completing forms, reviewing lengthy papers). yes   · Often loses things necessary for tasks or activities (e.g., school  materials, pencils, books, tools, wallets, keys, papenwork, eyeglasses, mobile telephones). yes   · Is often easily distracted by extraneous stimuli (for older adolescents and adults, may include unrelated thoughts). yes   · Is often forgetful in daily activities (e.g., doing chores, running errands; for older adolescents and adults, returning calls, paying bills, keeping appointments). yes     Hyperactivity symptoms 6/9:  · Often fidgets with or taps hands or feet or squirms in seat. yes  · Often leaves seat in situations when remaining seated is expected (e.g., leaves his or her place in the classroom, in the office or other workplace, or in other situations that require remaining in place). No  · Often runs about or climbs in situations where it is inappropriate. (Note: In adolescents or adults, may be limited to feeling restless). yes  · Often unable to play or engage in leisure activities quietly. no  · Is often ?on the go,? acting as if ?driven by a motor? (e.g., is unable to be or uncomfortable being still for extended time, as in restaurants, meetings; may be experienced by others as being restless or difficult to keep up with). no  · Often talks excessively. yes -   · Often blurts out an answer before a question has been completed (e.g., completes people?s sentences; cannot wait for turn in conversation). yes -   · Often has difficulty waiting his or her turn (e.g., while waiting in line). yes - walk around  · Often interrupts or intrudes on others (e.g., butts into conversations, games, or activities; may start using other people?s things without asking or receiving permission; for adolescents and adults, may intrude into or take over what others are doing). yes - as a child      PSYCHIATRIC REVIEW OF SYSTEMS:(none/ yes- better/worse/stable/& what symptoms)    Symptoms of Depression:  improved    Symptoms of Anxiety/ panic attacks: improved, no panic attacks    Symptoms of Katey/Hypomania: none    Symptoms  of psychosis: none    Sleep: better    Appetite: snacks more frequently when bored.     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         Risk Parameters:  Patient reports no suicidal ideation  Patient reports no homicidal ideation  Patient reports no self-injurious behavior  Patient reports no violent behavior    PSYCHIATRIC MED REVIEW    Current psych meds  Medication side effects:  Loss of appetite  Medication compliance:   n/a    Previous psych meds trials  adderall IR up to 20mg bid, prozac     PAST PSYCHIATRIC HISTORY:  Previous Psychiatric Diagnoses:  adhd  Previous Psychiatric Hospitalizations:  no   Previous SI/HI:  passive  Previous Suicide Attempts:  no   Previous Self injurious behaviors: no  Previous Medication Trials:  adderall ir 20mg bid  Psychiatric Care (current & past):   Over past months for adhd only  History of Psychotherapy:  5-6 sessions only  History of Violence:  no    SUBSTANCE ABUSE HISTORY:  Caffeine: no  Tobacco:  no  Alcohol:  no  Illicit Substances:  no  Misuse of Prescription Medications:  No   Other: Herbal supplements/ online supplements: no      FAMILY HISTORY:  Psychiatric:  Mom with depression, adhd, and self harm.  Family H/o suicide:  no    SOCIAL HISTORY:  Developmental/Childhood:Achieved all developmental milestones timely  Education:Bachelor's Degree in math and music  Employment Status/Finances:   Relationship Status/Sexual Orientation: Single:    Children: 0  Housing Status: Home , lives alone   history:  NO  Access to Firearms: NO;   Mormonism:Non-practicing  Recreational activities:Crafts, video games, music    LEGAL HISTORY:   Past charges/incarcerations: No   Pending charges:No     NEUROLOGIC HISTORY:  Seizures:  no   Head trauma:  no    MEDICAL REVIEW OF SYSTEMS:  Complete review of systems performed covering Constitutional, Cardiovascular,  Respiratory, Gastrointestinal, Musculoskeletal, Skin, Neurologic and Endocrine  All systems negative.    MEDICAL HISTORY:  Past Medical History:   Diagnosis Date    Anxiety     Depression        ALL MEDICATIONS:    Current Outpatient Medications:     albuterol (PROVENTIL/VENTOLIN HFA) 90 mcg/actuation inhaler, Inhale 2 puffs into the lungs every 6 (six) hours as needed for Wheezing., Disp: 18 g, Rfl: 3    EScitalopram oxalate (LEXAPRO) 10 MG tablet, Take 1 tablet (10 mg total) by mouth once daily., Disp: 90 tablet, Rfl: 0    ESTARYLLA 0.25-35 mg-mcg per tablet, Take by mouth., Disp: , Rfl:     FLULAVAL QUAD 5883-7850, PF, 60 mcg (15 mcg x 4)/0.5 mL Syrg, , Disp: , Rfl:     fluticasone propionate (FLOVENT HFA) 110 mcg/actuation inhaler, Inhale 1 puff into the lungs 2 (two) times daily. Controller, Disp: 12 g, Rfl: 0    ibuprofen (ADVIL,MOTRIN) 600 MG tablet, Take 1 tablet (600 mg total) by mouth 3 (three) times daily., Disp: 21 tablet, Rfl: 0    methylphenidate HCl (RITALIN LA) 20 MG 24 hr capsule, Take 1 capsule (20 mg total) by mouth every morning., Disp: 30 capsule, Rfl: 0    sars-cov-2, covid-19, (PFIZER COVID-19) 30 mcg/0.3 ml injection, , Disp: , Rfl:     ALLERGIES:  Review of patient's allergies indicates:   Allergen Reactions    Shellfish containing products Other (See Comments)     Throat itching         RELEVANT LABS/STUDIES:    No results found for: WBC, HGB, HCT, MCV, PLT  BMP  No results found for: NA, K, CL, CO2, BUN, CREATININE, CALCIUM, ANIONGAP, ESTGFRAFRICA, EGFRNONAA  No results found for: ALT, AST, GGT, ALKPHOS, BILITOT  No results found for: TSH  No results found for: LABA1C, HGBA1C      VITALS  There were no vitals filed for this visit.    PHYSICAL EXAM  General: well developed, well nourished  Neurologic:   Gait: Normal   Psychomotor signs:  No involuntary movements or tremor  AIMS: none    PSYCHIATRIC EXAM:    Mental Status Exam:  Appearance: unremarkable, age  "appropriate  Behavior/Cooperation: limited/ appropriate friendly and cooperative  Speech:  normal tone, normal rate, normal pitch, normal volume  Language: uses words appropriately; NO aphasia or dysarthria  Mood: steady, "better:  Affect:  Reactive, appropriate  Thought Process: normal and logical  Thought Content: normal, no suicidality, no homicidality, delusions, or paranoia  Level of Consciousness: Alert and Oriented x3  Memory:  Intact  Attention/concentration: appropriate for age/education.   Fund of Knowledge: appears adequate  Insight:  Intact  Judgment: Intact       IMPRESSION:    Flor Regalado is a 26 y.o. female with history of ADHD who presents for follow up of depression, anxiety and trouble with focus/attention..    DIAGNOSES:    ICD-10-CM ICD-9-CM   1. ADHD (attention deficit hyperactivity disorder), combined type  F90.2 314.01   2. Moderate episode of recurrent major depressive disorder  F33.1 296.32   3. Generalized anxiety disorder  F41.1 300.02       PLAN:  Psych Med:   Increase lexapro to 10mg , 90 d supply, advised to take half if develops side effects to higher dose.    Increase ritalin LA to 20mg daily. 30 d supply   Find provider in [place] asap.         Discussed with patient informed consent, risks vs. benefits, alternative treatments, side effect profile and the inherent unpredictability of individual responses to these treatments. Answered any questions patient may have had. The patient expresses understanding of the above and displays the capacity to agree with this current plan       RETURN TO CLINIC: 1 month if still in [place].     Mt Warren MD  [facility], PGY-3  05/30/2022        "

## 2023-01-25 ENCOUNTER — PATIENT MESSAGE (OUTPATIENT)
Dept: ADMINISTRATIVE | Facility: HOSPITAL | Age: 28
End: 2023-01-25
Payer: COMMERCIAL

## 2023-04-20 ENCOUNTER — PATIENT MESSAGE (OUTPATIENT)
Dept: PSYCHIATRY | Facility: CLINIC | Age: 28
End: 2023-04-20
Payer: COMMERCIAL

## 2023-08-18 ENCOUNTER — OFFICE VISIT (OUTPATIENT)
Dept: FAMILY MEDICINE CLINIC | Facility: CLINIC | Age: 28
End: 2023-08-18
Payer: COMMERCIAL

## 2023-08-18 VITALS
DIASTOLIC BLOOD PRESSURE: 62 MMHG | HEART RATE: 86 BPM | BODY MASS INDEX: 29.75 KG/M2 | SYSTOLIC BLOOD PRESSURE: 108 MMHG | OXYGEN SATURATION: 99 % | TEMPERATURE: 97 F | RESPIRATION RATE: 16 BRPM | HEIGHT: 63.78 IN | WEIGHT: 172.13 LBS

## 2023-08-18 DIAGNOSIS — F41.1 GAD (GENERALIZED ANXIETY DISORDER): ICD-10-CM

## 2023-08-18 DIAGNOSIS — F32.A DEPRESSION, UNSPECIFIED DEPRESSION TYPE: ICD-10-CM

## 2023-08-18 DIAGNOSIS — R68.81 EARLY SATIETY: ICD-10-CM

## 2023-08-18 DIAGNOSIS — E66.3 OVERWEIGHT (BMI 25.0-29.9): ICD-10-CM

## 2023-08-18 DIAGNOSIS — Z00.00 LABORATORY EXAM ORDERED AS PART OF ROUTINE GENERAL MEDICAL EXAMINATION: ICD-10-CM

## 2023-08-18 DIAGNOSIS — R10.13 EPIGASTRIC PAIN: ICD-10-CM

## 2023-08-18 DIAGNOSIS — Z00.01 ENCOUNTER FOR GENERAL ADULT MEDICAL EXAMINATION WITH ABNORMAL FINDINGS: Primary | ICD-10-CM

## 2023-08-18 DIAGNOSIS — F90.9 ATTENTION DEFICIT HYPERACTIVITY DISORDER (ADHD), UNSPECIFIED ADHD TYPE: ICD-10-CM

## 2023-08-18 PROBLEM — J45.20 MILD INTERMITTENT ASTHMA WITHOUT COMPLICATION: Status: ACTIVE | Noted: 2023-08-18

## 2023-08-18 PROBLEM — J45.20 MILD INTERMITTENT ASTHMA WITHOUT COMPLICATION (HCC): Status: ACTIVE | Noted: 2023-08-18

## 2023-08-18 PROCEDURE — 3078F DIAST BP <80 MM HG: CPT | Performed by: FAMILY MEDICINE

## 2023-08-18 PROCEDURE — 99203 OFFICE O/P NEW LOW 30 MIN: CPT | Performed by: FAMILY MEDICINE

## 2023-08-18 PROCEDURE — 3008F BODY MASS INDEX DOCD: CPT | Performed by: FAMILY MEDICINE

## 2023-08-18 PROCEDURE — 3074F SYST BP LT 130 MM HG: CPT | Performed by: FAMILY MEDICINE

## 2023-08-18 PROCEDURE — 99385 PREV VISIT NEW AGE 18-39: CPT | Performed by: FAMILY MEDICINE

## 2023-08-18 RX ORDER — BUPROPION HYDROCHLORIDE 150 MG/1
TABLET ORAL
COMMUNITY
Start: 2023-05-18 | End: 2023-08-18 | Stop reason: ALTCHOICE

## 2023-08-18 RX ORDER — BUPROPION HYDROCHLORIDE 300 MG/1
TABLET ORAL
COMMUNITY
Start: 2023-04-19

## 2023-08-18 RX ORDER — ALBUTEROL SULFATE 90 UG/1
2 AEROSOL, METERED RESPIRATORY (INHALATION) EVERY 6 HOURS PRN
COMMUNITY
Start: 2022-03-19

## 2023-08-18 RX ORDER — PANTOPRAZOLE SODIUM 20 MG/1
20 TABLET, DELAYED RELEASE ORAL
Qty: 60 TABLET | Refills: 0 | Status: SHIPPED | OUTPATIENT
Start: 2023-08-18

## 2023-08-18 RX ORDER — FLUTICASONE PROPIONATE 110 UG/1
1 AEROSOL, METERED RESPIRATORY (INHALATION) 2 TIMES DAILY
COMMUNITY
Start: 2022-03-28

## 2023-08-24 ENCOUNTER — LAB ENCOUNTER (OUTPATIENT)
Dept: LAB | Facility: HOSPITAL | Age: 28
End: 2023-08-24
Attending: FAMILY MEDICINE
Payer: COMMERCIAL

## 2023-08-24 DIAGNOSIS — Z00.01 ENCOUNTER FOR GENERAL ADULT MEDICAL EXAMINATION WITH ABNORMAL FINDINGS: ICD-10-CM

## 2023-08-24 LAB
ALBUMIN SERPL-MCNC: 4.1 G/DL (ref 3.4–5)
ALBUMIN/GLOB SERPL: 1.2 {RATIO} (ref 1–2)
ALP LIVER SERPL-CCNC: 85 U/L
ALT SERPL-CCNC: 23 U/L
ANION GAP SERPL CALC-SCNC: 5 MMOL/L (ref 0–18)
AST SERPL-CCNC: 17 U/L (ref 15–37)
BASOPHILS # BLD AUTO: 0.01 X10(3) UL (ref 0–0.2)
BASOPHILS NFR BLD AUTO: 0.2 %
BILIRUB SERPL-MCNC: 0.6 MG/DL (ref 0.1–2)
BUN BLD-MCNC: 11 MG/DL (ref 7–18)
CALCIUM BLD-MCNC: 9.1 MG/DL (ref 8.5–10.1)
CHLORIDE SERPL-SCNC: 107 MMOL/L (ref 98–112)
CHOLEST SERPL-MCNC: 196 MG/DL (ref ?–200)
CO2 SERPL-SCNC: 27 MMOL/L (ref 21–32)
CREAT BLD-MCNC: 0.75 MG/DL
EGFRCR SERPLBLD CKD-EPI 2021: 113 ML/MIN/1.73M2 (ref 60–?)
EOSINOPHIL # BLD AUTO: 0.03 X10(3) UL (ref 0–0.7)
EOSINOPHIL NFR BLD AUTO: 0.7 %
ERYTHROCYTE [DISTWIDTH] IN BLOOD BY AUTOMATED COUNT: 12.5 %
FASTING PATIENT LIPID ANSWER: YES
FASTING STATUS PATIENT QL REPORTED: YES
GLOBULIN PLAS-MCNC: 3.3 G/DL (ref 2.8–4.4)
GLUCOSE BLD-MCNC: 80 MG/DL (ref 70–99)
HCT VFR BLD AUTO: 41 %
HDLC SERPL-MCNC: 71 MG/DL (ref 40–59)
HGB BLD-MCNC: 14 G/DL
IMM GRANULOCYTES # BLD AUTO: 0.01 X10(3) UL (ref 0–1)
IMM GRANULOCYTES NFR BLD: 0.2 %
LDLC SERPL CALC-MCNC: 113 MG/DL (ref ?–100)
LYMPHOCYTES # BLD AUTO: 1.82 X10(3) UL (ref 1–4)
LYMPHOCYTES NFR BLD AUTO: 43.1 %
MCH RBC QN AUTO: 30.9 PG (ref 26–34)
MCHC RBC AUTO-ENTMCNC: 34.1 G/DL (ref 31–37)
MCV RBC AUTO: 90.5 FL
MONOCYTES # BLD AUTO: 0.3 X10(3) UL (ref 0.1–1)
MONOCYTES NFR BLD AUTO: 7.1 %
NEUTROPHILS # BLD AUTO: 2.05 X10 (3) UL (ref 1.5–7.7)
NEUTROPHILS # BLD AUTO: 2.05 X10(3) UL (ref 1.5–7.7)
NEUTROPHILS NFR BLD AUTO: 48.7 %
NONHDLC SERPL-MCNC: 125 MG/DL (ref ?–130)
OSMOLALITY SERPL CALC.SUM OF ELEC: 286 MOSM/KG (ref 275–295)
PLATELET # BLD AUTO: 278 10(3)UL (ref 150–450)
POTASSIUM SERPL-SCNC: 4.2 MMOL/L (ref 3.5–5.1)
PROT SERPL-MCNC: 7.4 G/DL (ref 6.4–8.2)
RBC # BLD AUTO: 4.53 X10(6)UL
SODIUM SERPL-SCNC: 139 MMOL/L (ref 136–145)
TRIGL SERPL-MCNC: 64 MG/DL (ref 30–149)
TSI SER-ACNC: 0.52 MIU/ML (ref 0.36–3.74)
VLDLC SERPL CALC-MCNC: 11 MG/DL (ref 0–30)
WBC # BLD AUTO: 4.2 X10(3) UL (ref 4–11)

## 2023-08-24 PROCEDURE — 36415 COLL VENOUS BLD VENIPUNCTURE: CPT

## 2023-08-24 PROCEDURE — 84443 ASSAY THYROID STIM HORMONE: CPT

## 2023-08-24 PROCEDURE — 80061 LIPID PANEL: CPT

## 2023-08-24 PROCEDURE — 85025 COMPLETE CBC W/AUTO DIFF WBC: CPT

## 2023-08-24 PROCEDURE — 80053 COMPREHEN METABOLIC PANEL: CPT

## 2023-08-28 ENCOUNTER — LAB ENCOUNTER (OUTPATIENT)
Dept: LAB | Facility: HOSPITAL | Age: 28
End: 2023-08-28
Attending: FAMILY MEDICINE
Payer: COMMERCIAL

## 2023-08-28 DIAGNOSIS — R68.81 EARLY SATIETY: ICD-10-CM

## 2023-08-28 PROCEDURE — 87338 HPYLORI STOOL AG IA: CPT

## 2023-08-31 LAB — H PYLORI AG STL QL IA: NEGATIVE

## 2024-08-05 ENCOUNTER — OFFICE VISIT (OUTPATIENT)
Dept: FAMILY MEDICINE CLINIC | Facility: CLINIC | Age: 29
End: 2024-08-05
Payer: COMMERCIAL

## 2024-08-05 DIAGNOSIS — Z11.1 SCREENING FOR TUBERCULOSIS: Primary | ICD-10-CM

## 2024-08-08 ENCOUNTER — OFFICE VISIT (OUTPATIENT)
Dept: FAMILY MEDICINE CLINIC | Facility: CLINIC | Age: 29
End: 2024-08-08

## 2024-08-08 DIAGNOSIS — Z11.1 ENCOUNTER FOR READING OF TUBERCULIN SKIN TEST: Primary | ICD-10-CM

## 2024-08-08 LAB — INDURATION (): 0 MM (ref 0–11)

## 2024-08-08 NOTE — PROGRESS NOTES
Patient presents for PPD read.     Test placed on 8/5/24    Results: 0.0 mm induration.     Letter of results printed and given patient.    No further questions or concerns at this time.

## 2025-06-17 ENCOUNTER — OFFICE VISIT (OUTPATIENT)
Dept: FAMILY MEDICINE CLINIC | Facility: CLINIC | Age: 30
End: 2025-06-17
Payer: COMMERCIAL

## 2025-06-17 VITALS
HEART RATE: 96 BPM | OXYGEN SATURATION: 98 % | HEIGHT: 63.78 IN | RESPIRATION RATE: 16 BRPM | DIASTOLIC BLOOD PRESSURE: 60 MMHG | BODY MASS INDEX: 27.48 KG/M2 | TEMPERATURE: 97 F | WEIGHT: 159 LBS | SYSTOLIC BLOOD PRESSURE: 100 MMHG

## 2025-06-17 DIAGNOSIS — Z00.01 ENCOUNTER FOR GENERAL ADULT MEDICAL EXAMINATION WITH ABNORMAL FINDINGS: Primary | ICD-10-CM

## 2025-06-17 DIAGNOSIS — F50.89 BINGE-PURGE BEHAVIOR: ICD-10-CM

## 2025-06-17 DIAGNOSIS — F41.1 GAD (GENERALIZED ANXIETY DISORDER): ICD-10-CM

## 2025-06-17 DIAGNOSIS — F90.2 ATTENTION DEFICIT HYPERACTIVITY DISORDER (ADHD), COMBINED TYPE: ICD-10-CM

## 2025-06-17 PROBLEM — F33.1 MODERATE EPISODE OF RECURRENT MAJOR DEPRESSIVE DISORDER (HCC): Status: ACTIVE | Noted: 2022-03-28

## 2025-06-17 RX ORDER — SERTRALINE HYDROCHLORIDE 25 MG/1
TABLET, FILM COATED ORAL
COMMUNITY
Start: 2025-05-23

## 2025-06-17 NOTE — ASSESSMENT & PLAN NOTE
Uncertain ADHD diagnosis; undergoing neuropsychological testing in [place], though not completed.  Had been prev under [place] by Psych for ADHD, though [place] provider requested formal testing; Difficulty accessing Vyvanse due to shortages and insurance. Adderall ineffective;   - Pt declined YONATAN Flaco, plans to find Psychiatry assistance herself

## 2025-06-17 NOTE — PROGRESS NOTES
Family Medicine Progress Note    Assessment & Plan:     Follow-Up: As needed  Transferring schools, needs support for psychiatric and general health management.  - Order nutritional labs at her convenience.  - Provide psychiatric referral and support services contact.  - Encourage contact for medication refills or local healthcare provider assistance.        Assessment & Plan  Encounter for general adult medical examination with abnormal findings  Wellness Exam done today and routine Preventative Care discussed as noted below.   -Pap smear: -  Record requested  -G&C testing: declined  -Contraception:  none   -Immunizations:  declined PCV  -Routine labs ordered.   -Healthy eating habits and regular exercise encouraged   Orders:    Lipid Panel; Future; Expected date: 06/17/2025    TSH W Reflex To Free T4; Future; Expected date: 06/17/2025    Binge-purge behavior  Binge eating and purging during stress. Recently stopped purging, gained weight, causing stress. Vyvanse previously used for binge eating.   - Encourage abstinence from purging.  - Pt declined [employer] assistance, will reach out if needed  - Nutritional labs ordered.   Orders:    Ferritin; Future; Expected date: 06/17/2025    Vitamin D; Future; Expected date: 06/17/2025    Vitamin B12; Future; Expected date: 06/17/2025    Comp Metabolic Panel (14); Future; Expected date: 06/17/2025    CBC With Differential With Platelet; Future; Expected date: 06/17/2025    Folic Acid Serum (Folate); Future; Expected date: 06/17/2025    Attention deficit hyperactivity disorder (ADHD), combined type  Uncertain ADHD diagnosis; undergoing neuropsychological testing in [place], though not completed.  Had been prev under [place] by Psych for ADHD, though [place] provider requested formal testing; Difficulty accessing Vyvanse due to shortages and insurance. Adderall ineffective;   - Pt declined YONATAN Flaco, plans to find Psychiatry assistance herself  EFFIE (generalized anxiety disorder)  Sertraline  effective for anxiety management.  - Continue sertraline as prescribed.-- PCM can bridge RX if having hard time establishing locally   - Monitor anxiety symptoms and adjust treatment as needed.              FOLLOW-UP: No follow-ups on file.     Subjective:      CC: Well Adult  History of Present Illness:  History obtained from patient.   Karsten Delgado is a 29 year old female who presents for Well Adult     History of Present Illness  ANNUAL PHYSICAL  LMP: Patient's last menstrual period was 06/11/2025.  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    Testing not complete and now transferring to [place].   Currently on Zoloft;  declined need for RF.  ADHD- prev on Vyvanse for Binge/focus,  difficulty accessing d/t supply issues.   Prev-Adderall, ineffective at high doses.     Briefly on OCPs due to heavy cycles   Seen by GYN in Feb Pap completed   Currently at [employer] in [employer] and hoping to transfer to [place]  DX with Bulemia -- lost about 15lbs;  binging and purging in law school, engagement,--- has since stopped purging, has had some weight gain, which is cocnerning for her.  Not established with MH provider, offered [facility] support, though declined.          EFFIE/ADHD/MDD-Followed by psych- Sertraline   Bulimia-  Asthma-controlled  Pshx: WTE  All: Shellfish  Meds: as below  Fam hx: Breast Ca-MGM; Thyroid Ca-M,  MDD-M,  DM-MGM  Soc hx: [facility] [employer], transferring to [place]   Ob/gyne: Patient's last menstrual period was 06/11/2025.. last pap: -, last mammogram: -, No obstetric history on file. ,    Colonoscopy: -      History/Other:   ROS-Per HPI     Problem List:  Problem List[1]    Current Medications:  Current Medications[2]   Past Medical History:  Past Medical History[3]   Past Surgical History:  Past Surgical History[4]   Family History:  Family History[5]   Social History:  Short Social Hx on File[6]    Allergies:  Allergies[7]     Objective:    VITALS: /60   Pulse 96   Temp 97 °F (36.1 °C) (Temporal)   Resp 16   Ht 5' 3.78\" (1.62 m)   Wt 159 lb (72.1 kg)   LMP 06/11/2025   SpO2 98%   BMI 27.48 kg/m²      BP Readings from Last 3 Encounters:   06/17/25 100/60   08/18/23 108/62     Wt Readings from Last 3 Encounters:   06/17/25 159 lb (72.1 kg)   08/18/23 172 lb 2 oz (78.1 kg)         PHYSICAL EXAM  GEN: pleasant, well-appearing in NAD, AOX3  SKIN: no visible rashes, lesions, or evidence of trauma  HEENT: PERRL, EOMI, moist mucous membranes, oropharynx clear, TM clear, nares patent, no Thyromegaly or nodule.   CV: RRR, no murmurs or abnl heart sounds   PULM: Clear to auscultation, No wheezes, rales, rhonchi.  Non-labored breathing.  ABD: Soft, non-tender, non-distended, + BS, no rigidity/guarding  EXT:  Warm, well perfused, no lower extremity edema  NEURO: CNs grossly intact, no focal weakness  MSK: moves all 4 extremities without difficulty  PSYCH: mood and affect are appropriate                Ronda Vee DO    NOTE TO PATIENT: The 21st Century Cures Act makes clinical notes like these available to patients in the interest of transparency. Clinical notes are medical documents used by physicians and care providers to communicate with each other. These documents include medical language and terminology, abbreviations, and treatment information that may sound technical and at times possibly unfamiliar. In addition, at times, the verbiage may appear blunt or direct. These documents are one tool providers use to communicate relevant information and clinical opinions of the care providers  in a way that allows common understanding of the clinical context.           [1]   Patient Active Problem List  Diagnosis    Depression    EFFIE (generalized anxiety disorder)    Attention deficit hyperactivity disorder (ADHD)    Mild intermittent asthma without complication (HCC)    Moderate episode of recurrent major depressive disorder (HCC)   [2]   Current Outpatient Medications   Medication Sig Dispense Refill    sertraline 25 MG Oral Tab Taking 3 tabs once a day (Patient taking differently: daily. Taking 3 tabs once a day)      buPROPion  MG Oral Tablet 24 Hr  (Patient not taking: Reported on 6/17/2025)      COVID-19 mRNA Virus Vaccine (COVID-19 MRNA VACCINE, PFIZER, IM)  (Patient not taking: Reported on 6/17/2025)      albuterol 108 (90 Base) MCG/ACT Inhalation Aero Soln Inhale 2 puffs into the lungs every 6 (six) hours as needed. (Patient not taking: Reported on 6/17/2025)      fluticasone propionate 110 MCG/ACT Inhalation Aerosol Inhale 1 puff into the lungs 2 (two) times daily. (Patient not taking: Reported on 6/17/2025)      pantoprazole 20 MG Oral Tab EC Take 1 tablet (20 mg total) by mouth every morning before breakfast. (Patient not taking: Reported on 6/17/2025) 60 tablet 0   [3]   Past Medical History:   Allergic rhinitis    Anxiety    Asthma (HCC)    Depression   [4]   Past Surgical History:  Procedure Laterality Date    Other surgical history  5/23/2022    [place] teeth removal   [5]   Family History  Problem Relation Age of Onset    Cancer Mother         Thyroid    Depression Mother     Cancer Maternal Grandmother         Breast    Diabetes Maternal Grandfather     Anemia Sister    [6]   Social History  Socioeconomic History    Marital status: Single   Tobacco Use    Smoking status: Never     Passive exposure: Never    Smokeless tobacco: Never   Vaping Use    Vaping status: Never Used   Substance and Sexual Activity    Alcohol use: Yes     Alcohol/week: 2.0 standard drinks of alcohol     Types:  2 Standard drinks or equivalent per week    Drug use: Never    Sexual activity: Yes     Partners: Male   Other Topics Concern    Caffeine Concern No    Exercise No    Seat Belt No    Special Diet No    Stress Concern Yes    Weight Concern Yes     Social Drivers of Health     Food Insecurity: No Food Insecurity (6/17/2025)    NCSS - Food Insecurity     Worried About Running Out of Food in the Last Year: No     Ran Out of Food in the Last Year: No   Transportation Needs: No Transportation Needs (6/17/2025)    NCSS - Transportation     Lack of Transportation: No   Housing Stability: Not At Risk (6/17/2025)    NCSS - Housing/Utilities     Has Housing: Yes     Worried About Losing Housing: No     Unable to Get Utilities: No   [7]   Allergies  Allergen Reactions    Shellfish ITCHING and SWELLING

## 2025-06-17 NOTE — ASSESSMENT & PLAN NOTE
Binge eating and purging during stress. Recently stopped purging, gained weight, causing stress. Vyvanse previously used for binge eating.   - Encourage abstinence from purging.  - Pt declined YONATAN Flaco assistance, will reach out if needed  - Nutritional labs ordered.   Orders:    Ferritin; Future; Expected date: 06/17/2025    Vitamin D; Future; Expected date: 06/17/2025    Vitamin B12; Future; Expected date: 06/17/2025    Comp Metabolic Panel (14); Future; Expected date: 06/17/2025    CBC With Differential With Platelet; Future; Expected date: 06/17/2025    Folic Acid Serum (Folate); Future; Expected date: 06/17/2025

## 2025-06-17 NOTE — PROGRESS NOTES
The following individual(s) verbally consented to be recorded using ambient AI listening technology and understand that they can each withdraw their consent to this listening technology at any point by asking the clinician to turn off or pause the recording:    Patient name: Karsten Sandy

## 2025-06-17 NOTE — ASSESSMENT & PLAN NOTE
Sertraline effective for anxiety management.  - Continue sertraline as prescribed.-- PCM can bridge RX if having hard time establishing locally   - Monitor anxiety symptoms and adjust treatment as needed.

## 2025-08-21 ENCOUNTER — PATIENT MESSAGE (OUTPATIENT)
Dept: FAMILY MEDICINE CLINIC | Facility: CLINIC | Age: 30
End: 2025-08-21

## (undated) NOTE — LETTER
August 8, 2024    Karsten Delgado  [address]  [facility] [address]      Dear Karsten:    The following are the results of your recent tests. Please review the list of test results.  Your result is the value on the left; we have also supplied the range of normal (low and high) values.    Results for orders placed or performed in visit on 08/05/24   TB /PPD intradermal test   Result Value Ref Range    Date Given: 8/5/24     Site: left forearm     INDURATION (PPD) 0.0 0.0 - 11 mm       Please call if you have further questions,    DOC Preciado  Board Certified Family Nurse Practitioner  [place] Licence # [phone]

## (undated) NOTE — LETTER
08/05/24  You will need to return to clinic in 48-72 hours to have results of TB test read.     Please return to clinic on 8/7 after 4:47 PM or on 8/8 before 4;47 PM to have your TB test read.    If you do not return during this time your test will need to be repeated.     Our clinic hours are:  Monday-Friday        8:00 am to 7:30 pm  Saturday/Sunday    9:00 am to 4:30 pm